# Patient Record
Sex: MALE | Race: BLACK OR AFRICAN AMERICAN | NOT HISPANIC OR LATINO | URBAN - METROPOLITAN AREA
[De-identification: names, ages, dates, MRNs, and addresses within clinical notes are randomized per-mention and may not be internally consistent; named-entity substitution may affect disease eponyms.]

---

## 2022-02-06 ENCOUNTER — EMERGENCY (EMERGENCY)
Facility: HOSPITAL | Age: 57
LOS: 1 days | Discharge: HOME | End: 2022-02-06
Admitting: EMERGENCY MEDICINE
Payer: MEDICAID

## 2022-02-06 VITALS
HEART RATE: 64 BPM | WEIGHT: 315 LBS | DIASTOLIC BLOOD PRESSURE: 75 MMHG | OXYGEN SATURATION: 97 % | HEIGHT: 71 IN | RESPIRATION RATE: 16 BRPM | TEMPERATURE: 98 F | SYSTOLIC BLOOD PRESSURE: 168 MMHG

## 2022-02-06 VITALS
DIASTOLIC BLOOD PRESSURE: 81 MMHG | RESPIRATION RATE: 16 BRPM | OXYGEN SATURATION: 98 % | HEART RATE: 71 BPM | SYSTOLIC BLOOD PRESSURE: 155 MMHG

## 2022-02-06 DIAGNOSIS — M25.561 PAIN IN RIGHT KNEE: ICD-10-CM

## 2022-02-06 DIAGNOSIS — M25.522 PAIN IN LEFT ELBOW: ICD-10-CM

## 2022-02-06 DIAGNOSIS — Z87.81 PERSONAL HISTORY OF (HEALED) TRAUMATIC FRACTURE: ICD-10-CM

## 2022-02-06 PROCEDURE — 99284 EMERGENCY DEPT VISIT MOD MDM: CPT

## 2022-02-06 PROCEDURE — 73080 X-RAY EXAM OF ELBOW: CPT | Mod: 26,LT

## 2022-02-06 PROCEDURE — 99284 EMERGENCY DEPT VISIT MOD MDM: CPT | Mod: 25

## 2022-02-06 PROCEDURE — 73564 X-RAY EXAM KNEE 4 OR MORE: CPT

## 2022-02-06 PROCEDURE — 96374 THER/PROPH/DIAG INJ IV PUSH: CPT

## 2022-02-06 PROCEDURE — 73564 X-RAY EXAM KNEE 4 OR MORE: CPT | Mod: 26,RT

## 2022-02-06 PROCEDURE — 73080 X-RAY EXAM OF ELBOW: CPT

## 2022-02-06 RX ORDER — KETOROLAC TROMETHAMINE 30 MG/ML
15 SYRINGE (ML) INJECTION ONCE
Refills: 0 | Status: DISCONTINUED | OUTPATIENT
Start: 2022-02-06 | End: 2022-02-06

## 2022-02-06 RX ADMIN — Medication 15 MILLIGRAM(S): at 02:48

## 2022-02-06 RX ADMIN — Medication 15 MILLIGRAM(S): at 03:35

## 2022-02-06 NOTE — ED ADULT TRIAGE NOTE - CHIEF COMPLAINT QUOTE
Pt presents with c/o recurring "RLE pain and left elbow pain" xone month, (s/p injury x 11 years ago per pt). No recent trauma. States "I wasn't doing anything tonight so I thought I would get it checked".

## 2022-02-06 NOTE — ED PROVIDER NOTE - CLINICAL SUMMARY MEDICAL DECISION MAKING FREE TEXT BOX
57 M h/o R femur fracture 2010 s/p surgical repair p/w R knee and L elbow pain x 10 years after injury, worse the past month.  xray R knee w/ hardware intact, no acute fracture noted, L elbow dx no fracture or dislocation.  educated on RICE and f/u with ortho.  discussed strict return parameters

## 2022-02-06 NOTE — ED PROVIDER NOTE - PHYSICAL EXAMINATION
Gen: well appearing, no acute distress  Skin: warm/dry, no rash noted  Resp: breathing comfortably, speaking in full sentences, no dyspnea  LUE: FROM All joints, pain w/ ROM elbow but no limit, no joint ttp, radial pulse 2+, no edema, SILT  RLE: minimal ttp over medial knee and patella, FROM all joints, DP/PT pulses 2+, no edema, SILT  Neuro: alert/oriented, ambulatory

## 2022-02-06 NOTE — ED ADULT NURSE NOTE - OBJECTIVE STATEMENT
recurring "RLE pain and left elbow pain" x one month, (s/p injury x 11 years ago per pt). No recent trauma. States "I wasn't doing anything tonight so I thought I would get it checked"

## 2022-02-06 NOTE — ED PROVIDER NOTE - PATIENT PORTAL LINK FT
You can access the FollowMyHealth Patient Portal offered by [facility] by registering at the following website: [email or web address]. By joining [employer]’s FollowMyHealth portal, you will also be able to view your health information using other applications (apps) compatible with our system.

## 2022-02-06 NOTE — ED PROVIDER NOTE - OBJECTIVE STATEMENT
57 M h/o R femur fracture 2010 s/p surgical repair p/w R knee and L elbow pain x 10 years after injury, worse the past month.  States he takes motrin without much relief.  Denies any new injuries, states he thought since it was cold the ED wouldn't be busy so he would come get checked out.  Denies f/c, chest pain, sob, numbness/weakness, limited ROM, skin changes or other concerns

## 2022-02-06 NOTE — ED PROVIDER NOTE - NSFOLLOWUPINSTRUCTIONS_ED_ALL_ED_FT
Take tylenol 650mg or motrin 400-800mg as needed every 4-6 hours for pain.   REST- Rest your hurting/injured joint or extremity to decrease pain and swelling for 24-48 hours    ICE- Apply ice to area of pain to decreased inflammation and pain, put towel/barrier between ice and skin. You can keep ice on for 20 minutes at a time 4-8 times daily   COMPRESSION- Wear ace wrap or brace for support to reduce swelling.  Make sure not to wrap too tight, loosen if skin feeling numb/tingling or skin turns blue   ELEVATION- Elevate hurting/injured area 6 or more inches about level of heart to decrease swelling/inflammation.  Use pillow under joint to elevate area    [facility] (Thursday afternoons) - call [phone] to schedule

## 2022-06-16 ENCOUNTER — EMERGENCY (EMERGENCY)
Facility: HOSPITAL | Age: 57
LOS: 1 days | Discharge: HOME | End: 2022-06-16
Attending: EMERGENCY MEDICINE | Admitting: EMERGENCY MEDICINE
Payer: MEDICAID

## 2022-06-16 VITALS
DIASTOLIC BLOOD PRESSURE: 83 MMHG | TEMPERATURE: 98 F | HEART RATE: 68 BPM | RESPIRATION RATE: 17 BRPM | SYSTOLIC BLOOD PRESSURE: 145 MMHG | OXYGEN SATURATION: 98 %

## 2022-06-16 VITALS
SYSTOLIC BLOOD PRESSURE: 148 MMHG | OXYGEN SATURATION: 95 % | DIASTOLIC BLOOD PRESSURE: 77 MMHG | TEMPERATURE: 98 F | HEIGHT: 71 IN | WEIGHT: 315 LBS | HEART RATE: 62 BPM | RESPIRATION RATE: 18 BRPM

## 2022-06-16 DIAGNOSIS — M54.2 CERVICALGIA: ICD-10-CM
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
## 2022-06-16 DIAGNOSIS — R51.9 HEADACHE, UNSPECIFIED: ICD-10-CM

## 2022-06-16 DIAGNOSIS — R42 DIZZINESS AND GIDDINESS: ICD-10-CM

## 2022-06-16 DIAGNOSIS — S09.90XA UNSPECIFIED INJURY OF HEAD, INITIAL ENCOUNTER: ICD-10-CM
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
## 2022-06-16 PROBLEM — Z78.9 OTHER SPECIFIED HEALTH STATUS: Chronic | Status: ACTIVE | Noted: 2022-02-06

## 2022-06-16 PROCEDURE — 72125 CT NECK SPINE W/O DYE: CPT | Mod: MA

## 2022-06-16 PROCEDURE — 73660 X-RAY EXAM OF TOE(S): CPT | Mod: 26,RT

## 2022-06-16 PROCEDURE — 70450 CT HEAD/BRAIN W/O DYE: CPT | Mod: MA

## 2022-06-16 PROCEDURE — 99285 EMERGENCY DEPT VISIT HI MDM: CPT

## 2022-06-16 PROCEDURE — 70450 CT HEAD/BRAIN W/O DYE: CPT | Mod: 26,MA

## 2022-06-16 PROCEDURE — 73660 X-RAY EXAM OF TOE(S): CPT

## 2022-06-16 PROCEDURE — 99284 EMERGENCY DEPT VISIT MOD MDM: CPT | Mod: 25

## 2022-06-16 PROCEDURE — 72125 CT NECK SPINE W/O DYE: CPT | Mod: 26,MA

## 2022-06-16 RX ORDER — OXYCODONE AND ACETAMINOPHEN 5; 325 MG/1; MG/1
1 TABLET ORAL ONCE
Refills: 0 | Status: DISCONTINUED | OUTPATIENT
Start: 2022-06-16 | End: 2022-06-16

## 2022-06-16 RX ORDER — OXYCODONE AND ACETAMINOPHEN 5; 325 MG/1; MG/1
1 TABLET ORAL
Qty: 12 | Refills: 0
Start: 2022-06-16 | End: 2022-06-18

## 2022-06-16 RX ADMIN — OXYCODONE AND ACETAMINOPHEN 1 TABLET(S): 5; 325 TABLET ORAL at 03:35

## 2022-06-16 NOTE — ED PROVIDER NOTE - NSFOLLOWUPINSTRUCTIONS_ED_ALL_ED_FT
Head Injury, Adult     There are many types of head injuries. They can be as minor as a small bump. Some head injuries can be worse. Worse injuries include:  •A strong hit to the head that shakes the brain back and forth, causing damage (concussion).    •A bruise (contusion) of the brain. This means there is bleeding in the brain that can cause swelling.    •A cracked skull (skull fracture).    •Bleeding in the brain that gathers, gets thick (makes a clot), and forms a bump (hematoma).    Most problems from a head injury come in the first 24 hours. However, you may still have side effects up to 7–10 days after your injury. It is important to watch your condition for any changes. You may need to be watched in the emergency department or urgent care, or you may need to stay in the hospital.    What are the causes?    There are many possible causes of a head injury. A serious head injury may be caused by:  •A car accident.    •Bicycle or motorcycle accidents.    •Sports injuries.    •Falls.    •Being hit by an object.    What are the signs or symptoms?    Symptoms of a head injury include a bruise, bump, or bleeding where the injury happened. Other physical symptoms may include:  •Headache.    •Feeling like you may vomit (nauseous) or vomiting.    •Dizziness.    •Blurred or double vision.    •Being uncomfortable around bright lights or loud noises.    •Shaking movements that you cannot control (seizures).    •Feeling tired.    •Trouble being woken up.    •Fainting or loss of consciousness.    Mental or emotional symptoms may include:  •Feeling grumpy or cranky.    •Confusion and memory problems.    •Having trouble paying attention or concentrating.    •Changes in eating or sleeping habits.    •Feeling worried or nervous (anxious).    •Feeling sad (depressed).    How is this treated?    Treatment for this condition depends on how severe the injury is and the type of injury you have. The main goal is to prevent problems and to allow the brain time to heal.    Mild head injury     If you have a mild head injury, you may be sent home, and treatment may include:  •Being watched. A responsible adult should stay with you for 24 hours after your injury and check on you often.    •Physical rest.    •Brain rest.    •Pain medicines.    Severe head injury    If you have a severe head injury, treatment may include:  •Being watched closely. This includes staying in the hospital.    •Medicines to:  •Help with pain.    •Prevent seizures.    •Help with brain swelling.    •Protecting your airway and using a machine that helps you breathe (ventilator).    •Treatments to watch for and manage swelling inside the brain.    •Brain surgery. This may be needed to:  •Remove a collection of blood or blood clots.    •Stop the bleeding.    •Remove a part of the skull. This allows room for the brain to swell.    Follow these instructions at home:    Activity     •Rest.    •Avoid activities that are hard or tiring.    •Make sure you get enough sleep.    •Let your brain rest. Do this by limiting activities that need a lot of thought or attention, such as:  •Watching TV.    •Playing memory games and puzzles.    •Job-related work or homework.    •Working on the computer, social media, and texting.    •Avoid activities that could cause another head injury until your doctor says it is okay. This includes playing sports. Having another head injury, especially before the first one has healed, can be dangerous.    •Ask your doctor when it is safe for you to go back to your normal activities, such as work or school. Ask your doctor for a step-by-step plan for slowly going back to your normal activities.    •Ask your doctor when you can drive, ride a bicycle, or use heavy machinery. Do not do these activities if you are dizzy.    Lifestyle      • Do not drink alcohol until your doctor says it is okay.    • Do not use drugs.    •If it is harder than usual to remember things, write them down.    •If you are easily distracted, try to do one thing at a time.    •Talk with family members or close friends when making important decisions.    •Tell your friends, family, a trusted co-worker, and  about your injury, symptoms, and limits (restrictions). Have them watch for any problems that are new or getting worse.    General instructions     •Take over-the-counter and prescription medicines only as told by your doctor.    •Have someone stay with you for 24 hours after your head injury. This person should watch you for any changes in your symptoms and be ready to get help.    •Keep all follow-up visits as told by your doctor. This is important.    How is this prevented?     •Work on your balance and strength. This can help you avoid falls.    •Wear a seat belt when you are in a moving vehicle.    •Wear a helmet when you:  •Ride a bicycle.    •Ski.    •Do any other sport or activity that has a risk of injury.    •If you drink alcohol:•Limit how much you use to:  •0–1 drink a day for nonpregnant women.    •0–2 drinks a day for men.    •Be aware of how much alcohol is in your drink. In the [place] one drink equals one 12 oz bottle of beer (355 mL), one 5 oz glass of wine (148 mL), or one 1½ oz glass of hard liquor (44 mL).    •Make your home safer by:  •Getting rid of clutter from the floors and stairs. This includes things that can make you trip.    •Using grab bars in bathrooms and handrails by stairs.    •Placing non-slip mats on floors and in bathtubs.    •Putting more light in dim areas.    Where to find more information    •Centers for Disease Control and Prevention: www.cdc.gov    Get help right away if:  •You have:  •A very bad headache that is not helped by medicine.    •Trouble walking or weakness in your arms and legs.    •Clear or bloody fluid coming from your nose or ears.    •Changes in how you see (vision).    •A seizure.    •More confusion or more grumpy moods.    •Your symptoms get worse.    •You are sleepier than normal and have trouble staying awake.    •You lose your balance.    •The black centers of your eyes (pupils) change in size.    •Your speech is slurred.    •Your dizziness gets worse.    •You vomit.    These symptoms may be an emergency. Do not wait to see if the symptoms will go away. Get medical help right away. Call your local emergency services (911 in the [place] Do not drive yourself to the hospital.     Summary    •Head injuries can be as minor as a small bump. Some head injuries can be worse.    •Treatment for this condition depends on how severe the injury is and the type of injury you have.    •Have someone stay with you for 24 hours after your head injury.    •Ask your doctor when it is safe for you to go back to your normal activities, such as work or school.    •To prevent a head injury, wear a seat belt in a car, wear a helmet when you use a bicycle, limit your alcohol use, and make your home safer.    This information is not intended to replace advice given to you by your health care provider. Make sure you discuss any questions you have with your health care provider.

## 2022-06-16 NOTE — ED PROVIDER NOTE - PROVIDER TOKENS
PROVIDER:[TOKEN:[[id number]]],PROVIDER:[TOKEN:[[id number]]],PROVIDER:[TOKEN:[[id number]]]

## 2022-06-16 NOTE — ED PROVIDER NOTE - CARE PROVIDER_API CALL
Verónica Brown)  Neurology  [address], [address] [place] [address]  Phone: [phone]  Fax: [phone]  Follow Up Time:     Yobani Houston)  Neurology; Neuromuscular Medicine  [address], [address] [place] [address]  Phone: [phone]  Fax: [phone]  Follow Up Time:     [place])  Neurology  [address] [place] [address]  Phone: [phone]  Fax: [phone]  Follow Up Time:

## 2022-06-16 NOTE — ED ADULT NURSE NOTE - CINV DISCH MEDS REVIEWED YN
percocet, patient educated not to drink alcohol or drive/operate machinery when taking medication or make important decisions/Yes

## 2022-06-16 NOTE — ED ADULT NURSE NOTE - NSIMPLEMENTINTERV_GEN_ALL_ED
Received call from Christina Galindo (HIPAA). Two pt identifiers confirmed. Evangelist Tsang states that over the past 2 days, pt has c/o dysuria/burning and decreased urine output. No fever. Evangelist Tsang asking for recommendations for the facility the pt is at is on complete lockdown and she is unable to be with her mom to do a VV. Evangelist Tsang informed Dr. Brittany Acevedo will be notified. Evangelist Tsang verbalized understanding of information discussed w/ no further questions at this time. Implemented All Fall Risk Interventions:  [place] to call system. Call bell, personal items and telephone within reach. Instruct patient to call for assistance. Room bathroom lighting operational. Non-slip footwear when patient is off stretcher. Physically safe environment: no spills, clutter or unnecessary equipment. Stretcher in lowest position, wheels locked, appropriate side rails in place. Provide visual cue, wrist band, yellow gown, etc. Monitor gait and stability. Monitor for mental status changes and reorient to person, place, and time. Review medications for side effects contributing to fall risk. Reinforce activity limits and safety measures with patient and family.

## 2022-06-16 NOTE — ED PROVIDER NOTE - CARE PROVIDERS DIRECT ADDRESSES
[email or web address]

## 2022-06-16 NOTE — ED ADULT NURSE NOTE - OBJECTIVE STATEMENT
Patient states this weekend a piece of the drop ceiling fell on his head. No bruises or abrasions noted to head/arms/chest/back. Patient also complaining of pain to right foot/toes. Patient was able to ambulate into the ed with steady gait. No swelling/bruising/malformation noted to right foot. (+) sensation noted to right foot/ (+) pulses noted to right foot.

## 2022-06-16 NOTE — ED PROVIDER NOTE - RESPIRATORY, MLM
Name and birthdate verified with pt.  Blood drawn from left ac x 1 attempt with success.    Pt tolerated procedure well.  Specimen labeled and sent to lab for processing.    Breath sounds clear and equal bilaterally.

## 2022-06-16 NOTE — ED PROVIDER NOTE - PROGRESS NOTE DETAILS
recommend f/u with neuro if HA persists  I have discussed the discharge plan with the patient. The patient agrees with the plan, as discussed.  The patient understands Emergency Department diagnosis is a preliminary diagnosis often based on limited information and that the patient must adhere to the follow-up plan as discussed.  The patient understands that if the symptoms worsen or if prescribed medications do not have the desired/planned effect that the patient may return to the Emergency Department at any time for further evaluation and treatment.

## 2022-06-16 NOTE — ED PROVIDER NOTE - PATIENT PORTAL LINK FT
You can access the FollowMyHealth Patient Portal offered by [facility] by registering at the following website: [email or web address]. By joining [employer]’s FollowMyHealth portal, you will also be able to view your health information using other applications (apps) compatible with our system.

## 2022-06-16 NOTE — ED PROVIDER NOTE - OBJECTIVE STATEMENT
denies pain/discomfort
57M no PMH c/o HA. pt states 4 days ago part the ceiling spontaneously fell on his head while he was in the bathroom. pt states he jumped and banged his right great toe into the tub. no LOC. however c/o persistent headache to top of head. feeling dizzy and out of sorts. also c/o pain to back of his neck. no vomiting. no numbness/weakness. taking tylenol and ibuprofen without relief.

## 2023-01-29 ENCOUNTER — EMERGENCY (EMERGENCY)
Facility: HOSPITAL | Age: 58
LOS: 1 days | Discharge: HOME | End: 2023-01-29
Admitting: EMERGENCY MEDICINE
Payer: MEDICAID

## 2023-01-29 VITALS
OXYGEN SATURATION: 96 % | HEIGHT: 71 IN | TEMPERATURE: 98 F | DIASTOLIC BLOOD PRESSURE: 62 MMHG | HEART RATE: 70 BPM | SYSTOLIC BLOOD PRESSURE: 153 MMHG | RESPIRATION RATE: 18 BRPM | WEIGHT: 315 LBS

## 2023-01-29 LAB
APPEARANCE UR: CLEAR — SIGNIFICANT CHANGE UP
BACTERIA # UR AUTO: PRESENT /HPF
BILIRUB UR-MCNC: NEGATIVE — SIGNIFICANT CHANGE UP
COLOR SPEC: YELLOW — SIGNIFICANT CHANGE UP
DIFF PNL FLD: NEGATIVE — SIGNIFICANT CHANGE UP
EPI CELLS # UR: SIGNIFICANT CHANGE UP /HPF (ref 0–5)
GLUCOSE UR QL: NEGATIVE — SIGNIFICANT CHANGE UP
HIV 1+2 AB+HIV1 P24 AG SERPL QL IA: SIGNIFICANT CHANGE UP
KETONES UR-MCNC: NEGATIVE — SIGNIFICANT CHANGE UP
LEUKOCYTE ESTERASE UR-ACNC: NEGATIVE — SIGNIFICANT CHANGE UP
NITRITE UR-MCNC: NEGATIVE — SIGNIFICANT CHANGE UP
PH UR: 6 — SIGNIFICANT CHANGE UP (ref 5–8)
PROT UR-MCNC: 30 MG/DL
RBC CASTS # UR COMP ASSIST: < 5 /HPF — SIGNIFICANT CHANGE UP
SP GR SPEC: >=1.03 — SIGNIFICANT CHANGE UP (ref 1–1.03)
UROBILINOGEN FLD QL: 0.2 E.U./DL — SIGNIFICANT CHANGE UP
WBC UR QL: < 5 /HPF — SIGNIFICANT CHANGE UP

## 2023-01-29 PROCEDURE — 87491 CHLMYD TRACH DNA AMP PROBE: CPT

## 2023-01-29 PROCEDURE — 87389 HIV-1 AG W/HIV-1&-2 AB AG IA: CPT

## 2023-01-29 PROCEDURE — 96372 THER/PROPH/DIAG INJ SC/IM: CPT

## 2023-01-29 PROCEDURE — 81001 URINALYSIS AUTO W/SCOPE: CPT

## 2023-01-29 PROCEDURE — 99283 EMERGENCY DEPT VISIT LOW MDM: CPT

## 2023-01-29 PROCEDURE — 87591 N.GONORRHOEAE DNA AMP PROB: CPT

## 2023-01-29 PROCEDURE — 36415 COLL VENOUS BLD VENIPUNCTURE: CPT

## 2023-01-29 PROCEDURE — 99284 EMERGENCY DEPT VISIT MOD MDM: CPT

## 2023-01-29 PROCEDURE — 86780 TREPONEMA PALLIDUM: CPT

## 2023-01-29 RX ORDER — CEFTRIAXONE 500 MG/1
1000 INJECTION, POWDER, FOR SOLUTION INTRAMUSCULAR; INTRAVENOUS ONCE
Refills: 0 | Status: COMPLETED | OUTPATIENT
Start: 2023-01-29 | End: 2023-01-29

## 2023-01-29 RX ORDER — AZITHROMYCIN 500 MG/1
1 TABLET, FILM COATED ORAL ONCE
Refills: 0 | Status: COMPLETED | OUTPATIENT
Start: 2023-01-29 | End: 2023-01-29

## 2023-01-29 RX ADMIN — CEFTRIAXONE 1000 MILLIGRAM(S): 500 INJECTION, POWDER, FOR SOLUTION INTRAMUSCULAR; INTRAVENOUS at 04:22

## 2023-01-29 RX ADMIN — AZITHROMYCIN 1 GRAM(S): 500 TABLET, FILM COATED ORAL at 04:21

## 2023-01-29 NOTE — ED PROVIDER NOTE - OBJECTIVE STATEMENT
The pt is a 57 y/o M, who presents to ED stating that he was notified that by his partner that she tested + for gc -- had unprotected intercourse 2 wks ago. Denies penile dc, testicular pain or swelling, dysuria, n/v/d, abd pain, rash, fevers, chills.

## 2023-01-29 NOTE — ED ADULT NURSE NOTE - OBJECTIVE STATEMENT
pt c/o having unprotected sex with woman recently and she had called him telling him she has gonorrhea.  pt is not symptomatic, no pain, no other complaints.

## 2023-01-29 NOTE — ED PROVIDER NOTE - PATIENT PORTAL LINK FT
You can access the FollowMyHealth Patient Portal offered by [facility] by registering at the following website: [email or web address]. By joining [employer]’s FollowMyHealth portal, you will also be able to view your health information using other applications (apps) compatible with our system.

## 2023-01-29 NOTE — ED PROVIDER NOTE - CLINICAL SUMMARY MEDICAL DECISION MAKING FREE TEXT BOX
pt concerned about std -- had unprotected intercourse w/someone who tested positive for gc, pt asymptomatic, full std panel sent, pt wanting pan tx -- given zithro rather then rx for doxy due to compliance concerns, safe sex discussed, to f/u w/pmd or gu, pt understands and agrees w/plan, strict return precautions given

## 2023-01-29 NOTE — ED ADULT NURSE NOTE - SKIN INTEGRITY
intact High Dose Vitamin A Pregnancy And Lactation Text: High dose vitamin A therapy is contraindicated during pregnancy and breast feeding.

## 2023-01-29 NOTE — ED ADULT TRIAGE NOTE - CHIEF COMPLAINT QUOTE
Pt requesting STD check. Pt states "I had unprotected sex and she told me today she tested + for gonorrhea. Denies any s/s.

## 2023-01-29 NOTE — ED PROVIDER NOTE - NSFOLLOWUPINSTRUCTIONS_ED_ALL_ED_FT
AN STD PANEL WAS SENT BUT WAS NOT RESULTED AT TIME OF DISCHARGE - YOU WILL BE NOTIFIED IF YOU TEST POSITIVE, YOU WERE TREATED FOR STDs IN ED, PLEASE USE PROTECTION TO PREVENT RE INFECTION / SPREAD, FOLLOW UP WITH  YOUR PMD OR UROLOGIST    A sexually transmitted disease (STD) is a disease or infection that may be passed (transmitted) from person to person, usually during sexual activity. This may happen by way of saliva, semen, blood, vaginal mucus, or urine. Symptoms vary depending on the type of STD acquired and may include pain in the groin, discharge, and lesions or a rash. If you are started on an antibiotic, take it exactly as instructed. Avoid sexual contact of any kind until cleared by a health care professional. Contact your sexual partner(s) to inform them of your diagnosis so that they may be tested and treated as well.    SEEK IMMEDIATE MEDICAL CARE IF YOU HAVE ANY OF THE FOLLOWING SYMPTOMS: severe abdominal pain, high fever, nausea/vomiting, or unintended weight loss.

## 2023-01-30 LAB
C TRACH RRNA SPEC QL NAA+PROBE: SIGNIFICANT CHANGE UP
N GONORRHOEA RRNA SPEC QL NAA+PROBE: SIGNIFICANT CHANGE UP
SPECIMEN SOURCE: SIGNIFICANT CHANGE UP

## 2023-01-31 DIAGNOSIS — Z20.2 CONTACT WITH AND (SUSPECTED) EXPOSURE TO INFECTIONS WITH A PREDOMINANTLY SEXUAL MODE OF TRANSMISSION: ICD-10-CM

## 2023-01-31 LAB — T PALLIDUM AB TITR SER: NEGATIVE — SIGNIFICANT CHANGE UP

## 2023-02-20 NOTE — ED ADULT NURSE NOTE - NSFALLRSKASSESSDT_ED_ALL_ED
"Refill request received from: patient    Last appointment: 12/06/2022    RTC: 12 weeks    Canceled appointments: 0    No Showed appointments: 1/31/2023    Follow up scheduled: 2/21/2023    Requested medication(s) (copy and paste last order information):    Disp Refills Start End ROSA   atomoxetine (STRATTERA) 40 MG capsule 30 capsule 0 1/23/2023  --   Sig - Route: Take 1 capsule (40 mg) by mouth daily - Oral   Sent to pharmacy as: Atomoxetine HCl 40 MG Oral Capsule (STRATTERA)   Class: E-Prescribe   Order: [id number]   E-Prescribing Status: Receipt confirmed by pharmacy (1/23/2023  1:53 PM CST)         Date medication last filled per outside med information: 1/23/2023 for 30 d/s    Months of medication pended per [facility] refill protocol: 1    Request was sent to RNCC Pool for approval    If patient is due for follow up \"Appointment required for further refills [phone]\" was placed in the sig of the medication and encounter was routed to scheduling pool to encourage follow up.     Medication pended by: Sofia Adam CMA    "
[facility] Call Center    Phone Message    May a detailed message be left on voicemail: yes     Reason for Call: Medication Refill Request    Has the patient contacted the pharmacy for the refill? Yes   Name of medication being requested: atomoxetine (STRATTERA) 40 MG capsule  Provider who prescribed the medication: Betsy Nagel MD  Pharmacy: [facility]Y #[id number] [address] [place] [address]  Date medication is needed: 2/21/23    Will be out of medication tomorrow      Action Taken: Message routed to:  Other: P [facility] Psychiatry    Travel Screening: Not Applicable                                                                      
Signed by protocol.  
29-Jan-2023 04:28

## 2023-11-12 ENCOUNTER — EMERGENCY (EMERGENCY)
Facility: HOSPITAL | Age: 58
LOS: 1 days | Discharge: HOME | End: 2023-11-12
Admitting: EMERGENCY MEDICINE
Payer: MEDICAID

## 2023-11-12 VITALS
TEMPERATURE: 98 F | OXYGEN SATURATION: 98 % | SYSTOLIC BLOOD PRESSURE: 180 MMHG | WEIGHT: 315 LBS | HEART RATE: 64 BPM | RESPIRATION RATE: 18 BRPM | DIASTOLIC BLOOD PRESSURE: 98 MMHG
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
## 2023-11-12 DIAGNOSIS — Z87.81 PERSONAL HISTORY OF (HEALED) TRAUMATIC FRACTURE: ICD-10-CM

## 2023-11-12 DIAGNOSIS — Z96.698 PRESENCE OF OTHER ORTHOPEDIC JOINT IMPLANTS: ICD-10-CM

## 2023-11-12 DIAGNOSIS — G89.29 OTHER CHRONIC PAIN: ICD-10-CM

## 2023-11-12 DIAGNOSIS — M25.551 PAIN IN RIGHT HIP: ICD-10-CM
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
## 2023-11-12 DIAGNOSIS — S62.001A UNSPECIFIED FRACTURE OF NAVICULAR [SCAPHOID] BONE OF RIGHT WRIST, INITIAL ENCOUNTER FOR CLOSED FRACTURE: ICD-10-CM

## 2023-11-12 DIAGNOSIS — M79.651 PAIN IN RIGHT THIGH: ICD-10-CM

## 2023-11-12 DIAGNOSIS — M25.531 PAIN IN RIGHT WRIST: ICD-10-CM

## 2023-11-12 DIAGNOSIS — M19.031 PRIMARY OSTEOARTHRITIS, RIGHT WRIST: ICD-10-CM

## 2023-11-12 PROCEDURE — 29130 APPL FINGER SPLINT STATIC: CPT | Mod: RT

## 2023-11-12 PROCEDURE — 99284 EMERGENCY DEPT VISIT MOD MDM: CPT | Mod: 25

## 2023-11-12 PROCEDURE — 73110 X-RAY EXAM OF WRIST: CPT

## 2023-11-12 PROCEDURE — 73552 X-RAY EXAM OF FEMUR 2/>: CPT | Mod: 26,RT

## 2023-11-12 PROCEDURE — 73130 X-RAY EXAM OF HAND: CPT

## 2023-11-12 PROCEDURE — 99284 EMERGENCY DEPT VISIT MOD MDM: CPT

## 2023-11-12 PROCEDURE — 73552 X-RAY EXAM OF FEMUR 2/>: CPT

## 2023-11-12 PROCEDURE — 73130 X-RAY EXAM OF HAND: CPT | Mod: 26,RT

## 2023-11-12 PROCEDURE — 73110 X-RAY EXAM OF WRIST: CPT | Mod: 26,RT

## 2023-11-12 RX ORDER — KETOROLAC TROMETHAMINE 30 MG/ML
15 SYRINGE (ML) INJECTION ONCE
Refills: 0 | Status: DISCONTINUED | OUTPATIENT
Start: 2023-11-12 | End: 2023-11-12

## 2023-11-12 RX ORDER — ACETAMINOPHEN 500 MG
975 TABLET ORAL ONCE
Refills: 0 | Status: COMPLETED | OUTPATIENT
Start: 2023-11-12 | End: 2023-11-12

## 2023-11-12 RX ADMIN — Medication 975 MILLIGRAM(S): at 15:41

## 2023-11-12 RX ADMIN — Medication 975 MILLIGRAM(S): at 14:53

## 2023-11-12 NOTE — ED ADULT NURSE NOTE - OBJECTIVE STATEMENT
Patient c/o of months of right leg pain and right wrist pain, denies any recent fall or injury, states pain becoming worse w/ limping gait.  States has a Hx of tray placement on right femur.  Last took Naproxen yesterday.  States Percocet works for pain.

## 2023-11-12 NOTE — ED PROVIDER NOTE - OBJECTIVE STATEMENT
59 y/o male w/ R wrist pain x 1.5 wks. Also c/o R hip/thigh pain x years (states has 'tray' in R femur) and has had chronic pain since surgery in 2009.  Denies known trauma/ injury.  Denies f/c, numbness/tingling/weakness to ext.

## 2023-11-12 NOTE — ED PROVIDER NOTE - PATIENT PORTAL LINK FT
You can access the FollowMyHealth Patient Portal offered by [facility] by registering at the following website: [email or web address]. By joining [employer]’s FollowMyHealth portal, you will also be able to view your health information using other applications (apps) compatible with our system.

## 2023-11-12 NOTE — ED PROVIDER NOTE - NSFOLLOWUPINSTRUCTIONS_ED_ALL_ED_FT
Thank you for visiting [facility] Emergency Department.      We saw you today for wrist and thigh pain.  You have an old appearing fracture in your right scaphoid bone (hand).    PAIN CONTROL:   You may take ibuprofen (Motrin, Advil) 600 mg (3 regular tablets) every 6 hours as needed for pain.  Please take with food.  Stop taking if you develop abdominal pain, dark/ bloody stools.  Do not mix with other NSAIDS (ie. Naproxen, Aleve, Celecoxib).  You may also take acetaminophen (Tylenol) 650-975mg (2-3 regular tablets) or 500-1000mg (1-2 extra strength tablets) every 6 hours as needed for pain.  Do not exceed 4000 mg in 1 day. These medications may be bought over the counter.    I recommend alternating the Ibuprofen and Tylenol so you are getting medications around the clock.  For example take the Ibuprofen, then 3 hours later take the Tylenol, then 3 hours later take the Ibuprofen, and repeat as needed.    Rest. Apply ice to affected area 20 minutes on, then 20 minutes off.  You may repeat throughout the day.  Please wear thumb spica splint at all times until you follow up with hand specialist.  You may also wear an ACE wrap to your knee. Elevate affected extremity.    Please follow up with an orthopedist in 1 week for re-evaluation.   Please know that no emergency visit is complete without follow-up with your primary care provider in 1 week.  Please bring copies of all discharge papers and results and show to your doctor.      Please continue taking all previous medications as instructed unless we discussed otherwise.     I appreciated your patience and hope you feel better soon.     Return to ER immediately if you develop fevers, chills, chest pain, shortness of breath, worsening and/or any concerning symptoms.

## 2023-11-12 NOTE — ED PROVIDER NOTE - CARE PROVIDER_API CALL
Zeke Garcia  Orthopaedic Surgery  [address]  [place] [address]  Phone: [phone]  Fax: [phone]  Follow Up Time:     Patrizia Au  Plastic Surgery  [address] [place] [address]  Phone: [phone]  Fax: [phone]  Follow Up Time:

## 2023-11-12 NOTE — ED PROVIDER NOTE - PHYSICAL EXAMINATION
CONSTITUTIONAL: Awake, alert.  Nontoxic, no acute distress.    HEAD: Normocephalic, atraumatic.    MUSCULOSKELETAL: Normal appearing extremities without obvious deformity, rash, ecchymosis, erythema.  +ttp to R radial aspect palm/ R radial wrist.  FROM b/l upper and lower extremities.  5/5 strength b/l upper and lower ext.  Sensation and motor function grossly intact.  Strong equal peripheral pulses b/l.   Cap refill < 2 b/l upper and lower ext.  All compartments soft.    SKIN: Skin in warm, dry and intact without rashes or lesions.  Appropriate color for ethnicity.    NEUROLOGICAL:  Patient is alert, oriented x person, place and time.    PSYCH: Appropriate mood and affect. Good judgment and insight.

## 2023-11-12 NOTE — ED ADULT NURSE REASSESSMENT NOTE - NS ED NURSE REASSESS COMMENT FT1
Xray done, pain improved s/p Tylenol PO.  Vital signs stable.  Thumb spica in place to right hand.  Discharged to home in stable condition.

## 2023-11-12 NOTE — ED PROVIDER NOTE - CLINICAL SUMMARY MEDICAL DECISION MAKING FREE TEXT BOX
57 y/o male w/ R wrist pain x 1.5 wks. Also c/o R hip/thigh pain x years (states has 'tray' in R femur) and has had chronic pain since surgery in 2009.  Denies known trauma/ injury.  Denies f/c, numbness/tingling/weakness to ext.  Exam with +ttp R radial aspect wrist    XR R wrist/ hand: Remodeled appearance of the scaphoid, suggestive of old scaphoid fracture. Widening of the scapholunate space suggestive of ligamentous   tear, likely chronic. Moderate to severe radiocarpal degenerative   changes. Recommend nonemergent MRI of the wrist to rule out acute on   chronic injury of the scaphoid.  XR R femur: Right femur with intramedullary tray fixation of old fracture. No evidence of hardware failure. Interval development from 2012 of severe   degenerative changes of the patellofemoral joint with likely   intra-articular loose bodies.    D/w pt results.  Placed in thumb spica splint  Will dc with close ortho/hand f/u  Pain control
